# Patient Record
Sex: FEMALE | Race: OTHER | HISPANIC OR LATINO | URBAN - METROPOLITAN AREA
[De-identification: names, ages, dates, MRNs, and addresses within clinical notes are randomized per-mention and may not be internally consistent; named-entity substitution may affect disease eponyms.]

---

## 2023-07-31 ENCOUNTER — EMERGENCY (EMERGENCY)
Facility: HOSPITAL | Age: 43
LOS: 1 days | Discharge: HOME | End: 2023-07-31
Attending: EMERGENCY MEDICINE
Payer: MEDICAID

## 2023-07-31 VITALS
WEIGHT: 165.35 LBS | DIASTOLIC BLOOD PRESSURE: 80 MMHG | HEART RATE: 83 BPM | SYSTOLIC BLOOD PRESSURE: 112 MMHG | RESPIRATION RATE: 16 BRPM | HEIGHT: 62 IN | TEMPERATURE: 98 F | OXYGEN SATURATION: 97 %

## 2023-07-31 VITALS
DIASTOLIC BLOOD PRESSURE: 75 MMHG | HEART RATE: 74 BPM | TEMPERATURE: 98 F | RESPIRATION RATE: 17 BRPM | SYSTOLIC BLOOD PRESSURE: 116 MMHG | OXYGEN SATURATION: 97 %

## 2023-07-31 PROCEDURE — 73630 X-RAY EXAM OF FOOT: CPT

## 2023-07-31 PROCEDURE — 99284 EMERGENCY DEPT VISIT MOD MDM: CPT | Mod: 25

## 2023-07-31 PROCEDURE — 73610 X-RAY EXAM OF ANKLE: CPT

## 2023-07-31 PROCEDURE — 73610 X-RAY EXAM OF ANKLE: CPT | Mod: 26,LT

## 2023-07-31 PROCEDURE — 73630 X-RAY EXAM OF FOOT: CPT | Mod: 26,LT

## 2023-07-31 PROCEDURE — 99284 EMERGENCY DEPT VISIT MOD MDM: CPT

## 2023-07-31 RX ORDER — IBUPROFEN 200 MG
600 TABLET ORAL ONCE
Refills: 0 | Status: COMPLETED | OUTPATIENT
Start: 2023-07-31 | End: 2023-07-31

## 2023-07-31 RX ADMIN — Medication 600 MILLIGRAM(S): at 13:01

## 2023-07-31 NOTE — ED ADULT NURSE NOTE - INTERPRETER'S NAME
Recent PHQ 2/9 Score - 0    PHQ 2:  Date Adult PHQ 2 Score Adult PHQ 2 Interpretation   3/8/2022 0 No further screening needed       PHQ 9:  Date Adult PHQ 9 Score Adult PHQ 9 Interpretation   3/5/2021 1 Minimal Depression        Zulema

## 2023-07-31 NOTE — ED PROVIDER NOTE - PROGRESS NOTE DETAILS
Xrays negative. Will dc home with podiatry follow up as needed. Pt is well appearing walking with steady gait, stable for discharge and follow up without fail with medical doctor. I had a detailed discussion with the patient and/or guardian regarding the historical points, exam findings, and any diagnostic results supporting the discharge diagnosis. Pt educated on care and need for follow up. Strict return instructions and red flag signs and symptoms discussed with patient. Questions answered. Pt shows understanding of discharge information and agrees to follow.

## 2023-07-31 NOTE — ED PROVIDER NOTE - NSFOLLOWUPINSTRUCTIONS_ED_ALL_ED_FT
Seguimiento con podología si el dolor persiste más de 1 semana.    [facility]cuidado de heridas  Podología/Cuidado de heridas  [address] [facility] [address]  Teléfono: [phone]  Tienen horario de entrada los pipe de 12:30 a 14 horas y los jueves de 8:30 a 10 horas.    Puede daphne motrin/tylenol según sea necesario para el dolor.    Si experimenta síntomas nuevos o que empeoran, o si está preocupado, siempre puede regresar a la emergencia para corby reevaluación.    Follow up with podiatry if pain persists longer than 1 week.     [facility]  Podiatry/Wound Care  [address] [place] [address]  Phone: [phone]  They have walk in hours on Tuesday from 12:30 pm to 2 pm and Thursdays 8:30 am to 10 am.     You can take motrin/tylenol as needed for pain.    If you experience any new or worsening symptoms or if you are concerned you can always come back to the emergency for a re-evaluation.

## 2023-07-31 NOTE — ED PROVIDER NOTE - PHYSICAL EXAMINATION
Left foot - No discoloration, erythema, deformity, lacerations, abrasions, ulcers or sensory deficits. +pedal pulse.  Capillary refill in lower extremity < 2 seconds. +TTP of distal metatarsals #2, 3 and 4.     Left ankle - +TTP of lateral malleolus.

## 2023-07-31 NOTE — ED ADULT NURSE NOTE - OBJECTIVE STATEMENT
AOX4 +ambulatory patient reports she missed a step x 4 days ago complaining of L foot pain. No deformity +pulses +tenderness L ankle

## 2023-07-31 NOTE — ED ADULT TRIAGE NOTE - BMI (KG/M2)
pt walked out of er. no sign of distress.

-------------------------------------------------------------------------------

Addendum: 10/01/19 at 1310 by SARAH

-------------------------------------------------------------------------------

lwfuentes by md. 30.2

## 2023-07-31 NOTE — ED PROVIDER NOTE - ATTENDING APP SHARED VISIT CONTRIBUTION OF CARE
seen with [facility]p  here for left foot pain after missing a step. This ocurred 4 days ago  xrays are negative  will refer to podiatry for follow up  agree with acps assessment hx and physical and disposition

## 2023-07-31 NOTE — ED PROVIDER NOTE - OBJECTIVE STATEMENT
#[id number]: 43-year-old female with no past medical history presents with left foot pain that began 4 days ago after she was walking down the stairs and missed a step.  Patient has taken ibuprofen and Voltaren cream with some relief.  Patient is able to ambulate.

## 2023-07-31 NOTE — ED PROVIDER NOTE - PATIENT PORTAL LINK FT
You can access the FollowMyHealth Patient Portal offered by [facility] by registering at the following website: [email or web address]. By joining [employer]’s FollowMyHealth portal, you will also be able to view your health information using other applications (apps) compatible with our system.

## 2023-07-31 NOTE — ED ADULT NURSE NOTE - NSFALLUNIVINTERV_ED_ALL_ED
Bed/Stretcher in lowest position, wheels locked, appropriate side rails in place/Call bell, personal items and telephone in reach/Instruct patient to call for assistance before getting out of bed/chair/stretcher/Non-slip footwear applied when patient is off stretcher/[place] to call system/Physically safe environment - no spills, clutter or unnecessary equipment/Purposeful proactive rounding/Room/bathroom lighting operational, light cord in reach

## 2023-07-31 NOTE — ED PROVIDER NOTE - CLINICAL SUMMARY MEDICAL DECISION MAKING FREE TEXT BOX
43 year old female with left foot and ankle pain s/p fall down step. Will obtain xray to r/o fracture and give medications for pain.